# Patient Record
Sex: FEMALE | Employment: OTHER | URBAN - METROPOLITAN AREA
[De-identification: names, ages, dates, MRNs, and addresses within clinical notes are randomized per-mention and may not be internally consistent; named-entity substitution may affect disease eponyms.]

---

## 2017-08-30 ENCOUNTER — OFFICE VISIT (OUTPATIENT)
Dept: SLEEP MEDICINE | Facility: CLINIC | Age: 72
End: 2017-08-30
Payer: MEDICARE

## 2017-08-30 VITALS
DIASTOLIC BLOOD PRESSURE: 78 MMHG | HEIGHT: 64 IN | HEART RATE: 68 BPM | BODY MASS INDEX: 28.86 KG/M2 | SYSTOLIC BLOOD PRESSURE: 122 MMHG | WEIGHT: 169.06 LBS

## 2017-08-30 DIAGNOSIS — J34.3 NASAL TURBINATE HYPERTROPHY: ICD-10-CM

## 2017-08-30 DIAGNOSIS — E83.10 DISORDER OF IRON METABOLISM: ICD-10-CM

## 2017-08-30 DIAGNOSIS — G25.81 RLS (RESTLESS LEGS SYNDROME): ICD-10-CM

## 2017-08-30 DIAGNOSIS — G47.30 UNSPECIFIED SLEEP APNEA: Primary | ICD-10-CM

## 2017-08-30 PROCEDURE — 99204 OFFICE O/P NEW MOD 45 MIN: CPT | Mod: S$GLB,,, | Performed by: NURSE PRACTITIONER

## 2017-08-30 PROCEDURE — 3008F BODY MASS INDEX DOCD: CPT | Mod: S$GLB,,, | Performed by: NURSE PRACTITIONER

## 2017-08-30 PROCEDURE — 1126F AMNT PAIN NOTED NONE PRSNT: CPT | Mod: S$GLB,,, | Performed by: NURSE PRACTITIONER

## 2017-08-30 PROCEDURE — 1159F MED LIST DOCD IN RCRD: CPT | Mod: S$GLB,,, | Performed by: NURSE PRACTITIONER

## 2017-08-30 PROCEDURE — 99999 PR PBB SHADOW E&M-NEW PATIENT-LVL IV: CPT | Mod: PBBFAC,,, | Performed by: NURSE PRACTITIONER

## 2017-08-30 NOTE — PATIENT INSTRUCTIONS
Obstructive Sleep Apnea  Obstructive sleep apnea is a condition that causes your air passages to become narrowed or blocked during sleep. As a result, breathing stops for short periods. Your body wakes up enough for breathing to begin again, though you don't remember it. The cycle of stopped breathing and brief awakenings can repeat dozens of times a night. This prevents the body from getting to the deeper stages of sleep that are needed for good rest.  Signs of sleep apnea include loud snoring, noisy breathing, and gasping sounds during sleep. Daytime symptoms include waking up tired after a full night's sleep, waking up with headaches, feeling very sleepy or falling asleep during the day, and having problems with memory or concentration.  Risk factors for sleep apnea include:  · Being overweight  · Being a man, or a woman in menopause  · Smoking  · Using alcohol or sedating medications or herbs  · Having enlarged structures in the nose or throat  Home care  Lifestyle changes that can help treat snoring and sleep apnea include the following:  · If you are overweight, lose weight. Talk to your healthcare provider about a weight-loss plan for you.  · Avoid alcohol for 3 to 4 hours before bedtime. Avoid sedating medications. Ask your healthcare provider about the medications you take.  · If you smoke, talk to your healthcare provider about ways to quit.  · Sleep on your side. This can help prevent gravity from pulling relaxed throat tissues into your breathing passages.  · If you have allergies or sinus problems that block your nose, ask your healthcare provider for help.  Follow up  Follow up with your healthcare provider as advised. A diagnosis of sleep apnea is made with a sleep study. Your healthcare provider can tell you more about this test.  When to seek medical care  Sleep apnea can make you more likely to have certain health problems. These include high blood pressure, heart attack, stroke, and sexual  dysfunction. If you have sleep apnea, talk to your healthcare provider about the best treatments for you.  Date Last Reviewed: 5/3/2015  © 4127-4961 [employer]. [address] [place] [address], [place] [address]. All rights reserved. This information is not intended as a substitute for professional medical care. Always follow your healthcare professional's instructions.      Gely or Bruce will contact you to schedule your sleep study. Their number is [phone] (ext 2). The [facility] is located on 7th floor of the [facility].    We will call you when the sleep study results are ready - if you have not heard from us by 2 weeks from the date of the study, please call [phone] (ext 1).    You are advised to abstain from driving should you feel sleepy or drowsy.

## 2017-08-30 NOTE — PROGRESS NOTES
"Shaniqua Minaya was seen as a new patient, referred by NICOLE Lam, for the evaluation of obstructive sleep apnea.     CHIEF COMPLAINT: Snoring,  daytime sleepiness    HISTORY OF PRESENT ILLNESS:Shaniqua Minaya a 72 y.o.  female presents for the evaluation of obstructive sleep apnea. She has never had a sleep study.  +snores. Walks every am. Tired since care home. Right jaw pain/neck and ear fulness ongoing ~ 7-8mos. More painful in evening time when yawning. Denies oral drying in am.  Denies am headaches. Denies witnessed apneic pauses. Now sleeps with head of bed elevated which has reduce her husbands snoring. Since taking SL 5mg melatonin she falls asleep w/i ~ 20min and if wakes up to use bathroom she is able to return to sleep. Nocturia 1x. Always waking up with nasal stuffiness. Denies sleepiness while driving, actually is very alert.     Recent anxiety/more worried about things, finds herself clenching teeth during daytime     When gets in bed, her legs hurt/ache/keep moving them, makes it hard to fall asleep.    On todays [place] Sleepiness Scale the patient scores a 10/24.     FAMILY HISTORY: No known sleep disorders.     SOCIAL HISTORY: . Seldom ETOH, no tobacco. Retired 3yr ago      REVIEW OF SYSTEMS:  Sleep related symptoms as per HPI; Positive overweight; occasional sinus congestion (Using now XClear); R knee pain. Denies dyspnea; Denies palpitations; Denies acid reflux; Denies polyuria; Denies headaches;Denies mood disturbance.  Otherwise, a balance of 10 systems reviewed is negative        PHYSICAL EXAM:   /78   Pulse 68   Ht 5' 4" (1.626 m)   Wt 76.7 kg (169 lb 1.5 oz)   BMI 29.02 kg/m²   GENERAL: W/D, W/N  body habitus, well groomed   HEENT: Conjunctivae are non-erythematous; Pupils equal, round, and reactive to light; Nose is symmetrical; Nasal mucosa is normal; Septum is midline; Inferior turbinates are swollen; Nasal airflow is restricted bilaterallyl; Posterior " pharynx is pink; Modified Mallampati: IV; Posterior palate is low; Tonsils 2+; Uvula is long/thick and pink;Tongue is high. Dentition is fair; No TMJ tenderness; Jaw opening and protrusion with R click + discomfort.   NECK: Supple. Neck circumference is 15 inches. No thyromegaly. No palpable nodes.   SKIN: On face and neck: No abrasions, no rashes, no lesions. No subcutaneous nodules are palpable.   RESPIRATORY: Chest is clear to auscultation. Normal chest expansion and non-labored breathing at rest.   CARDIOVASCULAR: Normal S1, S2. No murmurs, gallops or rubs. No carotid bruits bilaterally.   EXTREMITIES: No edema. No clubbing. No cyanosis. Station normal. Gait normal.   NEURO/PSYCH: Oriented to time, place and person. Normal attention span and concentration. Affect is full. Mood is normal.       ASSESSMENT:     Unspecified Sleep Apnea, with symptoms of snoring, disrupted sleep, teeth clenching, jaw pain, and daytime sleepiness, with exam findings of a crowded oral airway, with medical comorbidities of GERD, DM, hypertension. Warrants further investigation for untreated sleep apnea.     Nasal turbinate hypertrophy    RLS, NEC, potential iron metabolism disorder  Anxiety    PLAN:   1. Polysomnogram, discussed plan of care    2. Discussed etiology of ARNAV and potential ramifications of untreated ARNAV, including stroke, heart disease, HTN.  We discussed potential treatment options, which could include weight loss (10-15%), body positioning, continuous positive airway pressure (CPAP-definitive), mandibular advancement splint by dentist, or referral for surgical consideration.   3. The patient was advised to abstain from driving should she feel sleepy or drowsy.   4. See PCP re: anxiety, continue Xclear tid prn to reduce potential UARS  5. Discussed etiology and CYNTHIA as treatable cause of RLS, and other reasons for periodic limb movts during sleep. Check ferritin, goal>50. Discussed conservative therapies (continue  melatonin if she finds helpful at this time, and alternative specific pharmacotherapies).       Thank you for allowing me the opportunity to participate in the care of your patient

## 2017-08-30 NOTE — LETTER
August 30, 2017      Guilherme Lam, DDS  [address] [facility] [address]           [facility]  [address] [facility] [address] [place] [id number]  Phone: [phone]          Patient: Shaniqua Minaya   MR Number: [id number]   YOB: 1945   Date of Visit: 8/30/2017       Dear Dr. Guilherme Lam:    Thank you for referring Shaniqua Minaya to me for evaluation. Attached you will find relevant portions of my assessment and plan of care.    If you have questions, please do not hesitate to call me. I look forward to following Shaniqua Minaya along with you.    Sincerely,    Pratima Stephen, NP    Enclosure  CC:  No Recipients    If you would like to receive this communication electronically, please contact [email or web address] or [phone] to request more information on [employer] access.    For providers and/or their staff who would like to refer a patient to [facility], please contact us through our one-stop-shop provider referral line, [facility] , at [phone].    If you feel you have received this communication in error or would no longer like to receive these types of communications, please e-mail [email or web address]

## 2017-09-05 ENCOUNTER — LAB VISIT (OUTPATIENT)
Dept: LAB | Facility: HOSPITAL | Age: 72
End: 2017-09-05
Attending: NURSE PRACTITIONER
Payer: MEDICARE

## 2017-09-05 DIAGNOSIS — E83.10 DISORDER OF IRON METABOLISM: ICD-10-CM

## 2017-09-05 LAB — FERRITIN SERPL-MCNC: 17 NG/ML

## 2017-09-05 PROCEDURE — 36415 COLL VENOUS BLD VENIPUNCTURE: CPT | Mod: PO

## 2017-09-05 PROCEDURE — 82728 ASSAY OF FERRITIN: CPT

## 2017-09-06 ENCOUNTER — TELEPHONE (OUTPATIENT)
Dept: SLEEP MEDICINE | Facility: CLINIC | Age: 72
End: 2017-09-06

## 2017-09-12 ENCOUNTER — TELEPHONE (OUTPATIENT)
Dept: SLEEP MEDICINE | Facility: OTHER | Age: 72
End: 2017-09-12

## 2017-09-12 ENCOUNTER — HOSPITAL ENCOUNTER (OUTPATIENT)
Dept: SLEEP MEDICINE | Facility: HOSPITAL | Age: 72
Discharge: HOME | End: 2017-09-12
Attending: NURSE PRACTITIONER
Payer: MEDICARE

## 2017-09-12 DIAGNOSIS — G47.33 OSA (OBSTRUCTIVE SLEEP APNEA): ICD-10-CM

## 2017-09-12 DIAGNOSIS — G47.30 UNSPECIFIED SLEEP APNEA: ICD-10-CM

## 2017-09-12 PROCEDURE — 95810 POLYSOM 6/> YRS 4/> PARAM: CPT

## 2017-09-12 PROCEDURE — 95810 PR POLYSOMNOGRAPHY, 4 OR MORE: ICD-10-PCS | Mod: 26,,, | Performed by: PSYCHIATRY & NEUROLOGY

## 2017-09-12 PROCEDURE — 95810 POLYSOM 6/> YRS 4/> PARAM: CPT | Mod: 26,,, | Performed by: PSYCHIATRY & NEUROLOGY

## 2017-09-13 NOTE — PROGRESS NOTES
"Shaniqua Minaya is an ambulatory patient that was admitted to the lab for a PSG with a possible split. An  explaination of the purpose of the wires, call system, and procedures were explained.    EKG: frequent PACs, irregular rate.tachycardia    The patient did not qualify for a split study. Hypopneas apneas,arousals noted. apneas in REM noted.    The patient said she felt " I don't think I slept at all.  I feel awake. " in the morning after waking.    She was not too drowsy to drive home.  "

## 2017-09-18 ENCOUNTER — TELEPHONE (OUTPATIENT)
Dept: SLEEP MEDICINE | Facility: CLINIC | Age: 72
End: 2017-09-18